# Patient Record
Sex: FEMALE | Race: WHITE
[De-identification: names, ages, dates, MRNs, and addresses within clinical notes are randomized per-mention and may not be internally consistent; named-entity substitution may affect disease eponyms.]

---

## 2017-08-31 ENCOUNTER — HOSPITAL ENCOUNTER (EMERGENCY)
Dept: HOSPITAL 45 - C.EDB | Age: 57
Discharge: HOME | End: 2017-08-31
Payer: COMMERCIAL

## 2017-08-31 VITALS — HEART RATE: 75 BPM | DIASTOLIC BLOOD PRESSURE: 93 MMHG | SYSTOLIC BLOOD PRESSURE: 163 MMHG | OXYGEN SATURATION: 100 %

## 2017-08-31 VITALS
HEIGHT: 65.98 IN | HEIGHT: 65.98 IN | BODY MASS INDEX: 23.53 KG/M2 | BODY MASS INDEX: 23.53 KG/M2 | WEIGHT: 146.39 LBS | WEIGHT: 146.39 LBS

## 2017-08-31 VITALS — TEMPERATURE: 97.88 F

## 2017-08-31 VITALS — DIASTOLIC BLOOD PRESSURE: 79 MMHG | OXYGEN SATURATION: 96 % | HEART RATE: 60 BPM | SYSTOLIC BLOOD PRESSURE: 161 MMHG

## 2017-08-31 VITALS — DIASTOLIC BLOOD PRESSURE: 80 MMHG | HEART RATE: 73 BPM | SYSTOLIC BLOOD PRESSURE: 140 MMHG | OXYGEN SATURATION: 100 %

## 2017-08-31 VITALS — DIASTOLIC BLOOD PRESSURE: 74 MMHG | OXYGEN SATURATION: 94 % | SYSTOLIC BLOOD PRESSURE: 119 MMHG | HEART RATE: 53 BPM

## 2017-08-31 VITALS — DIASTOLIC BLOOD PRESSURE: 93 MMHG | OXYGEN SATURATION: 100 % | SYSTOLIC BLOOD PRESSURE: 168 MMHG | HEART RATE: 64 BPM

## 2017-08-31 VITALS — OXYGEN SATURATION: 99 % | HEART RATE: 68 BPM | SYSTOLIC BLOOD PRESSURE: 164 MMHG | DIASTOLIC BLOOD PRESSURE: 89 MMHG

## 2017-08-31 VITALS — HEART RATE: 55 BPM | OXYGEN SATURATION: 97 % | SYSTOLIC BLOOD PRESSURE: 144 MMHG | DIASTOLIC BLOOD PRESSURE: 74 MMHG

## 2017-08-31 VITALS — OXYGEN SATURATION: 100 %

## 2017-08-31 DIAGNOSIS — Z79.82: ICD-10-CM

## 2017-08-31 DIAGNOSIS — S60.512A: ICD-10-CM

## 2017-08-31 DIAGNOSIS — S53.024A: ICD-10-CM

## 2017-08-31 DIAGNOSIS — M25.421: ICD-10-CM

## 2017-08-31 DIAGNOSIS — S52.001A: Primary | ICD-10-CM

## 2017-08-31 DIAGNOSIS — Z79.899: ICD-10-CM

## 2017-08-31 DIAGNOSIS — Z88.5: ICD-10-CM
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
## 2017-08-31 NOTE — OPERATIVE REPORT
DATE OF OPERATION:  08/31/2017

 

PROCEDURE AND CONSULT NOTE

 

CHIEF COMPLAINT:  Right elbow pain and injury.

 

Marisa is a delightful young lady.  She is 56 years of age.  She suffered a

fall on outstretched right upper extremity earlier today, the 31st of August.

 She suffered a Monteggia injury to her right elbow, essentially a fracture

of the proximal ulna and a dislocation of the right radial head.

 

On examination, she had some numbness and tingling, significant pain.  No

laceration.  No breakage of skin.  Neurologically intact.  Mild swelling.

 

X-rays demonstrated a Monteggia lesion of the right elbow, which is the

comminuted fracture of metaphyseal area of the right ulna plus a subluxation

and dislocation of the radial head.

 

DISPOSITION:  Includes,

1.  A conscious sedation in the Emergency Room ____ by Dr. Woodard.

2.  We performed a closed reduction with flexion, supination and slight

traction.  Placed her in a posterior splint for support.  Ice, rest, and

elevation.  We will see her back in the office in approximately 4-5 days. 

Norco can be offered for pain.  She should maintain her splint.  Instructions

and precautions provided.

 

 

I attest to the content of the Intraoperative Record and any orders documented therein. Any exception
s are noted below.

## 2017-08-31 NOTE — EMERGENCY ROOM VISIT NOTE
Post-Moderate Sedation Plan


General


Date of Moderate Sedation


Aug 31, 2017.


Vital Signs:





Vital Signs Past 12 Hours








  Date Time  Temp Pulse Resp B/P (MAP) Pulse Ox O2 Delivery O2 Flow Rate FiO2


 


8/31/17 15:02  53 18 119/74 94 Room Air  


 


8/31/17 14:31  55 15 145/75 97   


 


8/31/17 13:46  52 14 132/72    


 


8/31/17 13:06  54 16 126/73 100 Room Air  


 


8/31/17 12:56  51      


 


8/31/17 12:26  55 15 134/83 97 Room Air  


 


8/31/17 10:27 36.6 59 20 125/71 96 Room Air  











Review - Discharge Plan


Post Moderate Sedation Plan:





On clinical assessment, the patient appears to have tolerated the conscious 

sedation 


without complications.





Patient is recovering as anticipated.





Patient will continue to be monitored by nursing and may be discharged when 

conscious 


sedation discharge criteria are met.

## 2017-08-31 NOTE — DIAGNOSTIC IMAGING REPORT
RIGHT FOREARM 2 VIEWS ROUTINE, ELBOW 2 VIEWS, WRIST 2 VIEW, RIGHT HAND MIN 3

VIEWS ROUTINE



CLINICAL HISTORY: Fall. Right arm pain. Right hand pain. Right wrist pain.   



COMPARISON STUDY:  Right elbow 6/3/2015.



FINDINGS: There is a comminuted fracture within the proximal metadiaphysis of

the right ulna which demonstrates mild anterior angulation. The fracture may

extend to the articular surface. There is posterior dislocation of the radius or

relation to the humerus. There is mild posterior subluxation/dislocation of the

ulna and relation to the humerus. There is an associated elbow effusion and mild

soft tissue swelling. No fracture or dislocation within the right hand or right

wrist. No radiopaque foreign bodies.



IMPRESSION:  

1. Comminuted fracture within the proximal metadiaphysis of the right ulna which

may extend to the articular surface. There is also mild posterior

subluxation/dislocation of the ulna in relation to the humerus.

2. Posterior dislocation of the proximal radius in relation to the ulna.

3. Elbow effusion.

4. No acute fracture or dislocation within the right wrist or hand. 









Electronically signed by:  Иван Toscano M.D.

8/31/2017 12:15 PM



Dictated Date/Time:  8/31/2017 12:10 PM

## 2017-08-31 NOTE — EMERGENCY ROOM VISIT NOTE
ED Visit Note


First contact with patient:  15:42


Conscious sedation note:





Reason: Elbow fracture dislocation reduction.





Total time: 21 minutes





Medications used: 25 mg of propofol and 25 mg of ketamine.





The patient was sedated using above medications with good results.  The patient'

s reduction was without difficulty or pain.  She maintained good pulse ox and 

carbon dioxide levels as well as normal vital signs during the conscious 

sedation.  Last oral intake was 8 hours ago.

## 2017-08-31 NOTE — DIAGNOSTIC IMAGING REPORT
RIGHT SHOULDER MIN 2 VIEWS ROUTINE



CLINICAL HISTORY: Right upper extremity pain following fall.    



COMPARISON: None



FINDINGS:  Evaluation of the right shoulder is suboptimal given difficulty

positioning. Alignment appears anatomic and no acute fracture is identified.

There is moderate AC joint arthrosis.



IMPRESSION: No acute fracture or dislocation of the right shoulder. Study mildly

compromised due to difficulty positioning.







Electronically signed by:  Lloyd Soto M.D.

8/31/2017 12:09 PM



Dictated Date/Time:  8/31/2017 12:08 PM

## 2017-08-31 NOTE — DIAGNOSTIC IMAGING REPORT
RIGHT ELBOW 2 VIEWS



HISTORY:  56 years-old Female right elbow reduction

Right 



COMPARISON: Right elbow radiographs the same day at 11:35 AM



TECHNIQUE: Single post reduction lateral view of the right elbow



FINDINGS: 

There has been interval casting and reduction of the previously noted angulated

comminuted mildly displaced proximal ulnar fracture.. There is improved

alignment status post reduction. The articular head of the radius now appears to

be in anatomic position on this single view. There remains a 3.0 x 0.7 cm bone

fragment volar to the proximal. Moderate joint effusion. Fine bony detail is

obscured by cast.



IMPRESSION: 

1. Improved alignment of the comminuted proximal ulnar fracture status post

reduction and casting. There remains a 3.0 x 0.7 cm bone fragment adjacent to

the volar aspect of the proximal ulna.

2. Satisfactory alignment of the radial head in relation to the capitellum on

this single view.







The above report was generated using voice recognition software. It may contain

grammatical, syntax or spelling errors.







Electronically signed by:  Cali Victor M.D.

8/31/2017 4:04 PM



Dictated Date/Time:  8/31/2017 4:02 PM

## 2017-08-31 NOTE — EMERGENCY ROOM VISIT NOTE
Pre-Mod Sedation Assessment


General


Date of Moderate Sedation:


Aug 31, 2017.


Vital Signs:





Vital Signs Past 12 Hours








  Date Time  Temp Pulse Resp B/P (MAP) Pulse Ox O2 Delivery O2 Flow Rate FiO2


 


8/31/17 15:02  53 18 119/74 94 Room Air  


 


8/31/17 14:31  55 15 145/75 97   


 


8/31/17 13:46  52 14 132/72    


 


8/31/17 13:06  54 16 126/73 100 Room Air  


 


8/31/17 12:56  51      


 


8/31/17 12:26  55 15 134/83 97 Room Air  


 


8/31/17 10:27 36.6 59 20 125/71 96 Room Air  











Review


Cardiovascular:  regular rate, rhythm


Abdomen:  normal bowel sounds


Lungs:  chest non-tender





Pre-Sedation Airway Assessment


Oral Cavity:  WNL


Short Thick Neck:  No


Hx of Sleep Apnea:  Yes


Smoking Status:  Never Smoker


Mallampati Classification:  Class I





Procedure Planning


Contraindications-for Mod Sed:  None





Notes








The planned sedation has been discussed with the patient and consent obtained.  

I have


identified the patient, determined the appropriateness of sedation and have 

assessed the


patient immediately prior to the procedure.  





All medicine(s) and interventions are by my order.

## 2017-09-01 NOTE — EMERGENCY ROOM VISIT NOTE
ED Visit Note


First contact with patient:  10:59


Chief Complaint: I fell and hurt my right arm.





History of Present Illness: Ms. Box is a 56-year-old white female who is 

brought into the ED via ambulance complaining of right upper extremity pain.


Patient reports less than an hour ago she was walking on the sidewalk, tripped 

and fell on her extended right arm.  She reports she immediately had pain in 

the right elbow and proximal forearm.  Since that time her pain has been 

constant.  She describes her pain as a sharp and throbbing sensation.  She 

rates her discomfort 4/10.  Her pain is nonradiating.  Her pain worsens with 

palpation, all attempts to flex or extend the elbow or supinate or pronate the 

forearm.  She has not identified any alleviating factors related to the pain.  

Associated with her pain she reports since the fall happen she is also having 

pain over the clavicle area and the wrist and hand.  She denies any arm weakness

/numbness/tingling.


She reports she previously had a fracture of the proximal radial head from a 

fall and had no complications.


Additionally patient denies any lightheaded or dizziness before the fall, 

striking her head at the time of the fall, loss of consciousness at the time of 

the fall, signs of head injury since the fall, neck pain, back pain, chest pain

, shortness of breath, abdominal pain, nausea/vomiting, left upper extremity 

pain, lower extremity pain..





Review of Systems: As noted above in history of present illness.  All body 

systems were reviewed and found to be negative as noted above.





Past Medical History: As previously noted.


Current Medications:








 Medications  Dose


 Route/Sig


 Max Daily Dose Days Date Category Dose


Instructions


 


 Vitamin B-12


  (Cyanocobalamin)


 Unknown Strength


 Tab  Unknown Dose


 PO DAILY


    8/31/17 Reported 


 


 Vitamin D3


  (Cholecalciferol)


 2,000 Unit Cap  1 Cap


 PO DAILY


   30 8/31/17 Reported 


 


 Aspirin Ec


  (Aspirin) 81 Mg


 Tab  81 Mg


 PO DAILY


    8/31/17 Reported 


 


 Glucosamine &


 Chondroitin


 500-400 mg


  (Glucosamine-Chondroitin)


 1 Cap Cap  1 Cap


 PO DAILY


    5/3/15 Reported 


 


 Fish Oil (Omega-3


 Fatty Acids) 1


 Cap Cap  1 Cap


 PO DAILY


    5/3/15 Reported 


 


 Estring


  (Estradiol) 2 Mg


 Vagring  2 Mg


 VAGRING [id number]


    5/3/15 Reported 


 


 Astelin Nasal


 Spray (Azelastine


 Hcl) 200


 Sprays/30 Ml Spray  1-2 Sprays


 NA BID


    5/3/15 Reported 





Allergies to Medications: Oxycodone; nausea/vomiting.


Social History: Patient is currently employed; she feels safe in her home 

environment; she denies tobacco use; she admits to social alcohol use.





Physical Examination:


Vital Signs: 








  Date Time  Temp Pulse Resp B/P (MAP) Pulse Ox O2 Delivery O2 Flow Rate FiO2


 


8/31/17 19:22  55 20 144/74 97   


 


8/31/17 17:39  62 20 137/87 98 Room Air  


 


8/31/17 17:00  60  161/79 96 Room Air  


 


8/31/17 16:55  61      


 


8/31/17 16:06  59 18 142/77 94   


 


8/31/17 15:56  60 18 152/84    


 


8/31/17 15:46    168/93    


 


8/31/17 15:45  64 18 168/93 100 Nasal Cannula 3.5 


 


8/31/17 15:41  75 18 163/93    


 


8/31/17 15:40  75 18 163/93 100 Nasal Cannula 3.5 


 


8/31/17 15:39    164/89    


 


8/31/17 15:37  68 18 164/89 99 Nasal Cannula 3.5 


 


8/31/17 15:36  73 18 140/80    


 


8/31/17 15:35   18  100 Nasal Cannula 3.5 


 


8/31/17 15:34  73 18 140/80 100 Nasal Cannula 3.5 


 


8/31/17 15:02  53 18 119/74 94 Room Air  


 


8/31/17 14:31  55 15 145/75 97   


 


8/31/17 13:46  52 14 132/72    


 


8/31/17 13:06  54 16 126/73 100 Room Air  


 


8/31/17 12:56  51      


 


8/31/17 12:26  55 15 134/83 97 Room Air  


 


8/31/17 10:27 36.6 59 20 125/71 96 Room Air  








GENERAL: 56-year-old female in mild distress due to pain, nontoxic-appearing, 

afebrile and hemodynamically stable.


NEUROLOGICAL: Awake, alert and oriented to person, place and time.  Answering 

questions appropriately and following commands.  Normal gait.  Good hand eye 

coordination.  Radial nerves II through XII grossly intact.  Good short-term 

and long-term recall.  Able to spell and count backwards.


SKIN: Warm, dry and pink.  Left Hand: 2 superficial abrasions noted over the 

posterior aspect of the left hand on the ring and little finger.  No active 

bleeding.


HEENT:  Atraumatic and normocephalic.  No raccoon's eyes or limon signs.  No 

drainage from the ears of the nostril; no hemotympanum.  No facial bony 

tenderness, swelling or ecchymosis.  PERRLA.  EOMI without nystagmus.  No 

malocclusion.  Airway patent.  Speech normal.  No lymphadenopathy.  Trachea 

midline.  No jugular venous distention.


BACK:  No tenderness over the bony cervical, thoracic and lumbar spine.  Full 

range of motion of the cervical spine.  No CVA tenderness.  


THORAX:  Lungs sounds are clear to auscultation and equal bilaterally with 

symmetrical chest wall.  No crepitus, tenderness, subcutaneous air or 

deformities noted.


ABDOMEN: Flat, soft and nontender.  Positive bowel sounds in all quadrants.  No 

guarding, rigidity or organomegaly.


RIGHT UPPER EXTREMITY: Mild tenderness over the distal clavicle and anterior 

humeral head without bony deformity or crepitus.  No tenderness over the 

acromioclavicular joint.  No tenderness over this scapula or underlying ribs.  

No tenderness throughout the proximal humerus.  Moderate tenderness over the 

distal humerus, proximal and radius and ulna with moderate swelling, bony 

deformity and crepitus.  Mild tenderness over the distal radius and ulna 

without bony deformity, bony crepitus, swelling or ecchymosis.  Mild tenderness 

over the fourth and fifth metatarsals without bony deformity or crepitus.  With 

her elbows stabilized she was able to flex and extend her wrist which did cause 

him small amount of discomfort and she was able to flex and extend all fingers.

  Throughout the hand the skin was warm and pink shoes able to distinguish 

light sensations through all dermatomes.


LEFT UPPER EXTREMITY: No gross bony deformity.  No tenderness throughout the 

shoulder, humerus, elbow, forearm or wrist.  Distal neurovascular statuses are 

intact.


LOWER EXTREMITY: No gross bony deformity.  No shortening or malrotation.  No 

tenderness over the hips, thighs, knees, lower legs, ankles or feet.  Distal 

neurovascular statuses are intact and equal bilaterally.


demand and with purpose.  All distal neurovascular statuses are intact and 

equal bilaterally.  No calf tenderness or cords.





ED Course:


Patient is assessed as noted above.


Patient's medication list was reviewed.


Patient was initially offered medication and refused; subsequently she reported 

she wanted something for pain but only something mild and she was given 1 g of 

acetaminophen by mouth.  During subsequent evaluation she reports that she had 

an increase in pain and she was given 4 mg of morphine IV and 4 mg of Zofran IV.


Patient was hydrated with normal saline.


Right Shoulder X-Rays: Were read by myself and the radiologist showing no acute 

fractures or dislocations.


Right Hand X-Rays: Were read by myself and the radiologist showing no acute 

fractures or dislocations.


Right Forearm And Elbow X-Rays: Were read by myself and the radiologist showing 

a comminuted fracture within the proximal metadiaphysis of the right ulna which 

extends into the articular surface.  There is also mild posterior subluxation/

dislocation of the ulna and relationship to the humerus.  There is also 

posterior dislocation of the proximal radius and relationship to the ulna.  A 

joint effusion is present within the elbow.


Patient was assessed multiple times during her stay in the emergency department 

per


Patient's case was consulted with Dr. Gao, orthopedic surgeon; he came to 

the ED for further evaluate the patient.


Patient's case was reviewed with Dr. Woodard; conscious sedation was performed 

by Dr. Woodard and patient's fracture and dislocation was reduced by Dr. Gao

; please see their notes and orders for these procedures.


Patient was educated about today's findings and instructed on her treatment plan

; she verbalizes understanding and agreement with this plan.





Clinical Impression: Comminuted fracture of the proximal ulna with posterior 

subluxation/dislocation to the humerus.  Posterior dislocation of the proximal 

radius and relationship to the ulna.  Elbow joint effusion.  Left hand 

abrasions.  Status post fall.





Disposition: Patient discharged home in stable condition accompanied by one of 

her neighbors; prior to departure she was reassessed and subjectively reported 

she was pain-free.





Plan:


Comfort measures were discussed with the patient including rest, splint and 

sling use, ice and a sliding pain scale of acetaminophen and Norco; she was 

given appropriate narcotic precautions and her name was checked in the state 

database and no red flags were noted.


Patient is encouraged to follow-up with Dr. Gao for recheck next Tuesday.


Patient is encouraged return ED for worsening/uncontrolled pain, uncontrolled 

swelling, arm/hand weakness/numbness/tingling or any new/concerning symptoms.

## 2018-01-10 ENCOUNTER — HOSPITAL ENCOUNTER (OUTPATIENT)
Dept: HOSPITAL 45 - C.PAPS | Age: 58
Discharge: HOME | End: 2018-01-10
Attending: OBSTETRICS & GYNECOLOGY
Payer: COMMERCIAL

## 2018-01-10 DIAGNOSIS — Z78.0: ICD-10-CM

## 2018-01-10 DIAGNOSIS — Z30.019: ICD-10-CM

## 2018-01-10 DIAGNOSIS — Z12.4: Primary | ICD-10-CM

## 2018-01-10 DIAGNOSIS — J31.0: ICD-10-CM

## 2018-01-10 DIAGNOSIS — R87.619: ICD-10-CM

## 2018-03-06 ENCOUNTER — HOSPITAL ENCOUNTER (OUTPATIENT)
Dept: HOSPITAL 45 - C.MAMM | Age: 58
Discharge: HOME | End: 2018-03-06
Attending: OBSTETRICS & GYNECOLOGY
Payer: COMMERCIAL

## 2018-03-06 DIAGNOSIS — Z12.31: Primary | ICD-10-CM

## 2018-03-08 NOTE — MAMMOGRAPHY REPORT
BILATERAL DIGITAL SCREENING MAMMOGRAM TOMOSYNTHESIS WITH CAD: 3/6/2018

CLINICAL HISTORY: Routine screening.  Patient has no complaints.  





TECHNIQUE:  Breast tomosynthesis in addition to standard 2D mammography was performed. Current study 
was also evaluated with a Computer Aided Detection (CAD) system.  



COMPARISON: Comparison is made to exams dated:  4/2/2015 mammogram, 3/3/2014 mammogram, 8/27/2010 [facility], 7/9/2009, 6/17/2008, and 2/27/2007.   



BREAST COMPOSITION:  The tissue of both breasts is heterogeneously dense, which may obscure small mas
ses.  



FINDINGS:  There are scattered, stable punctate calcifications in both breasts.  No suspicious mass, 
architectural distortion or cluster of microcalcifications is seen.  



IMPRESSION:  ACR BI-RADS CATEGORY 2: BENIGN

There is no mammographic evidence of malignancy. A 1 year screening mammogram is recommended.  The pa
tient will receive written notification of the results.  





Approximately 10% of breast cancers are not detected with mammography. A negative mammographic report
 should not delay biopsy if a clinically suggestive mass is present.



Minda Wild M.D.          

ay/:3/6/2018 15:29:55  



Imaging Technologist: Agustina HINDS([id number]), [facility]

letter sent: Normal 1/2  

BI-RADS Code: ACR BI-RADS Category 2: Benign